# Patient Record
Sex: MALE | Race: WHITE | URBAN - METROPOLITAN AREA
[De-identification: names, ages, dates, MRNs, and addresses within clinical notes are randomized per-mention and may not be internally consistent; named-entity substitution may affect disease eponyms.]

---

## 2023-01-31 ENCOUNTER — OFFICE VISIT (OUTPATIENT)
Facility: CLINIC | Age: 28
End: 2023-01-31

## 2023-01-31 ENCOUNTER — TELEPHONE (OUTPATIENT)
Facility: CLINIC | Age: 28
End: 2023-01-31

## 2023-01-31 VITALS
WEIGHT: 256.69 LBS | HEART RATE: 76 BPM | HEIGHT: 76 IN | BODY MASS INDEX: 31.26 KG/M2 | SYSTOLIC BLOOD PRESSURE: 140 MMHG | DIASTOLIC BLOOD PRESSURE: 83 MMHG

## 2023-01-31 DIAGNOSIS — R13.10 DYSPHAGIA, UNSPECIFIED TYPE: ICD-10-CM

## 2023-01-31 DIAGNOSIS — K92.1 HEMATOCHEZIA: ICD-10-CM

## 2023-01-31 DIAGNOSIS — R11.10 VOMITING, UNSPECIFIED VOMITING TYPE, UNSPECIFIED WHETHER NAUSEA PRESENT: Primary | ICD-10-CM

## 2023-01-31 DIAGNOSIS — K21.9 GASTROESOPHAGEAL REFLUX DISEASE WITHOUT ESOPHAGITIS: ICD-10-CM

## 2023-01-31 DIAGNOSIS — K62.5 BRBPR (BRIGHT RED BLOOD PER RECTUM): Primary | ICD-10-CM

## 2023-01-31 PROCEDURE — 99204 OFFICE O/P NEW MOD 45 MIN: CPT | Performed by: STUDENT IN AN ORGANIZED HEALTH CARE EDUCATION/TRAINING PROGRAM

## 2023-01-31 PROCEDURE — 3077F SYST BP >= 140 MM HG: CPT | Performed by: STUDENT IN AN ORGANIZED HEALTH CARE EDUCATION/TRAINING PROGRAM

## 2023-01-31 PROCEDURE — 3008F BODY MASS INDEX DOCD: CPT | Performed by: STUDENT IN AN ORGANIZED HEALTH CARE EDUCATION/TRAINING PROGRAM

## 2023-01-31 PROCEDURE — 3079F DIAST BP 80-89 MM HG: CPT | Performed by: STUDENT IN AN ORGANIZED HEALTH CARE EDUCATION/TRAINING PROGRAM

## 2023-01-31 NOTE — TELEPHONE ENCOUNTER
Scheduled for:  Colonoscopy [phone] ,[address]   Provider Name:  Liliam Garcia  Date:  5/18/23  Location:  [facility] [place]  Sedation:  [place]   Time:  0730 Am(pt is aware that [facility] will call the day before to confirm arrival time)     Prep: Split dose Golytely ,Egd  Prep instructions were given to pt in the office, pt verbalized understanding. Meds/Allergies Reconciled?:  Physician reviewed     Diagnosis with codes:  Hematochezia K92.1,Vomiting R11.10 , Dysphagia R10.13  Was patient informed to call insurance with codes (Y/N):  Yes, I confirmed [employer] [place] insurance with the patient. The patient also verbally understands to call his insurance to check for pre-cert, codes were given on prep instructions. Referral sent?:  Yes  [facility] [place] or [address] notified?:  Electronic case request was sent to [facility] via [employer]. Medication Orders: This patient verbally confirmed that he is not taking:   Iron, blood thinners, BP meds, and is not diabetic   Not latex allergy, Not PCN allergy and does not have a pacemaker Pt is aware to NOT take iron pills, herbal meds and diet supplements for 7 days before exam. Also to NOT take any form of alcohol, recreational drugs and any forms of ED meds 24 hours before exam.    Misc Orders:  Patient was informed that they will need a COVID 19 test prior to their procedure. Patient verbally understood & will await a phone call from [facility] to schedule.       Further instructions given by staff:

## 2023-01-31 NOTE — PATIENT INSTRUCTIONS
Start taking Omeprazole 40mg daily -- take before dinner    1. Schedule colonoscopy with [facility] [Diagnosis: hematochezia] AND EGD with [facility] [diagnosis: vomiting, dysphaghia]    2.  bowel prep from pharmacy (split )    3. Continue all medications for procedure    4. Read all bowel prep instructions carefully    5. AVOID seeds, nuts, popcorn, raw fruits and vegetables (cooked is okay) for 2-3 days before procedure    6. You MAY need to go for COVID testing 72 hours before procedure. The testing team will call you a few days before your procedure to discuss with you if testing is required. If you are asked to go for COVID testing and do not completed the test, the procedure cannot be performed. 7. If you start any NEW medication after your visit today, please notify us. Certain medications will need to be held before the procedure, or the procedure cannot be performed.

## 2023-02-03 RX ORDER — OMEPRAZOLE 40 MG/1
40 CAPSULE, DELAYED RELEASE ORAL DAILY
Qty: 30 CAPSULE | Refills: 11 | Status: SHIPPED | OUTPATIENT
Start: 2023-02-03

## 2023-03-30 ENCOUNTER — TELEPHONE (OUTPATIENT)
Facility: CLINIC | Age: 28
End: 2023-03-30

## 2023-05-15 ENCOUNTER — TELEPHONE (OUTPATIENT)
Dept: GASTROENTEROLOGY | Facility: CLINIC | Age: 28
End: 2023-05-15

## 2023-05-15 NOTE — TELEPHONE ENCOUNTER
I called [facility]    The pt's bowel prep was on file    They will get it ready for him    LM on pt's voice mail that his bowel prep will be ready in about one hour

## 2023-05-18 ENCOUNTER — LAB REQUISITION (OUTPATIENT)
Dept: SURGERY | Age: 28
End: 2023-05-18
Payer: COMMERCIAL

## 2023-05-18 ENCOUNTER — SURGERY CENTER DOCUMENTATION (OUTPATIENT)
Dept: SURGERY | Age: 28
End: 2023-05-18

## 2023-05-18 DIAGNOSIS — R13.10 DYSPHAGIA: ICD-10-CM

## 2023-05-18 DIAGNOSIS — K92.1 BLOOD IN STOOL: ICD-10-CM

## 2023-05-18 DIAGNOSIS — Z12.11 SPECIAL SCREENING FOR MALIGNANT NEOPLASMS, COLON: ICD-10-CM

## 2023-05-18 PROCEDURE — 45378 DIAGNOSTIC COLONOSCOPY: CPT | Performed by: STUDENT IN AN ORGANIZED HEALTH CARE EDUCATION/TRAINING PROGRAM

## 2023-05-18 PROCEDURE — 88305 TISSUE EXAM BY PATHOLOGIST: CPT | Performed by: STUDENT IN AN ORGANIZED HEALTH CARE EDUCATION/TRAINING PROGRAM

## 2023-05-18 PROCEDURE — 43239 EGD BIOPSY SINGLE/MULTIPLE: CPT | Performed by: STUDENT IN AN ORGANIZED HEALTH CARE EDUCATION/TRAINING PROGRAM

## 2023-05-18 PROCEDURE — 88312 SPECIAL STAINS GROUP 1: CPT | Performed by: STUDENT IN AN ORGANIZED HEALTH CARE EDUCATION/TRAINING PROGRAM

## 2023-05-19 NOTE — PROGRESS NOTES
Biopsy results reviewed. There is mild active inflammation in the duodenum as well as the stomach but staining for H. pylori is negative. The patient clinically feels well and has no abdominal pain at this time. I do think that he would benefit from a short course of omeprazole 40 mg once a day before breakfast for the next 3 months then stop. This was discussed with the patient on 5/18/2023. He will follow-up for screening colonoscopy at age 37. [employer] result note sent to the patient.

## 2023-05-26 ENCOUNTER — MED REC SCAN ONLY (OUTPATIENT)
Facility: CLINIC | Age: 28
End: 2023-05-26

## (undated) NOTE — LETTER
3/30/2023              [address]         Dear Melanie Campuzano records indicate that the tests ordered for you by Roxanne Velazquez MD  have not been done. If you have, in fact, already completed the tests or you do not wish to have the tests done, please contact our office at [address] [place] [address]. Otherwise, please proceed with the testing. Enclosed is a duplicate order for your convenience.     Labs:     C-REACTIVE PROTEIN   CALPROTECTIN, FECAL      Sincerely,    Roxanne Velzaquez MD  [address] [address] [phone]